# Patient Record
(demographics unavailable — no encounter records)

---

## 2017-09-02 NOTE — EMERGENCY ROOM VISIT NOTE
History


First contact with patient:  02:14


Chief Complaint:  ALCOHOL OVERDOSE


Stated Complaint:  UNCONSCIOUS ALCOHOL OVERDOSE


Nursing Triage Summary:  


pt brought to ed via ems from friends apartment on [facility]. per friends pt 


was drinking 4-5 cups junlge juice and was vomiting. friends attempted to clean 


pt and were unable to get pt up.





History of Present Illness


The patient is a 19 year old female who presents to the Emergency Room with 

complaints of alcohol overdose.  Patient was vomiting and the friend's 

apartment and they summoned EMS.  They tried to clean her up and shower her.  

Patient is intoxicated and moans to sternal rub.  EMS reports no fall or trauma.





Review of Systems


Unable to obtain secondary to altered mental status from alcohol intoxication





Past Medical/Surgical History


Unable to obtain secondary to altered mental status from alcohol intoxication





Social History


Smoking Status:  Unknown if Ever Smoked


Occupation Status:  [employer]





Current/Historical Medications


Unable to Obtain Active Prescriptions or Reported Meds





Physical Exam


Vital Signs











  Date Time  Temp Pulse Resp B/P (MAP) Pulse Ox O2 Delivery O2 Flow Rate FiO2


 


9/2/17 06:26  79      


 


9/2/17 06:00  92 14 96/48 96 Room Air  


 


9/2/17 05:00  76 14 111/67 96 Room Air  


 


9/2/17 04:16  89 18 112/61 96 Room Air  


 


9/2/17 02:25  77      


 


9/2/17 02:21 36.7 81 18 102/54 97 Room Air  


 


9/2/17 02:21     98 Room Air  











Physical Exam








PHYSICAL EXAM: VITALS: Vitals are noted on the nurse's note and reviewed by 

myself.  Vital signs stable.


GENERAL: Passed out female with EtOH odor who responds to sternal rub, in no 

acute distress, nondiaphoretic, well-developed well-nourished.  The patient is 

visibly intoxicated.


SKIN: The skin was without obvious lacerations, abrasions, or rashes.  There is 

no tenting of the skin.  Capillary reflex less than 2 seconds.


HEENT: Normocephalic, atraumatic.  PERRLA.  EOMI.  Conjunctiva with mild 

injection without icterus.  Tympanic membranes without erythema or effusion 

bilaterally no hemotympanum.  External auditory canals are clear.  Nares patent 

bilaterally.  No epistaxis.  Oropharynx without erythema or exudate.  Uvula 

midline.  Oral mucosal moist.  No lymphadenopathy.  Neck is supple without 

cervical spine tenderness.


HEART: Regular rate and rhythm without murmurs gallops or rubs.  Peripheral 

pulses 2+.


LUNGS: Clear to auscultation bilaterally without wheezes, rales or rhonchi.  


ABDOMEN: Positive bowel sounds x 4.  Normal tympanic percussion.  Soft, 

nontender, without masses or organomegaly.


MUSCULOSKELETAL: Gross motor function of the upper and lower extremities 

intact.  The patient has a staggering gait.


NEUROLOGIC:  The patient is visibly intoxicated.  Once they were more sober 

they were alert and oriented to person place and time.





Medical Decision & Procedures


Laboratory Results


9/2/17 02:25

















Test


  9/2/17


02:25


 


Anion Gap


  7.0 mmol/L


(3-11)


 


Estimated GFR (


American) 136.1 


 


 


Estimated GFR (Non-


American 117.4 


 


 


BUN/Creatinine Ratio 10.1 (10-20) 


 


Calcium Level


  8.2 mg/dl


(8.5-10.1)


 


Ethyl Alcohol mg/dL


  199.0 mg/dl


(0-3)











ED Course


Prior records/ancillary studies reviewed.


Triage Nursing notes reviewed.


Additional history obtained from EMS.





The patient's history was concerning for altered mental status and a possible 

alcohol overdose.





Differential diagnosis:


Etiologies such as alcohol intoxication, toxicologic, infection, hypoglycemia, 

electrolyte abnormalities, cardiac sources, intracerebral event, neurologic, as 

well as others were entertained.





Physical examination:


As above.  The patient is clinically intoxicated.  no trauma noted.





ER treatment provided:


Monitoring


Aspiration precautions


The patient was frequently reassessed.





Diagnostic interpretation by me:


Cardiac monitoring did not reveal any evidence of dysrhythmia.





The labs revealed no worrisome electrolyte abnormality. The patient's blood 

alcohol level was 199 mg/dL.








The patient's history was reviewed once they were more coherent and their 

intoxication cleared. The patient states they have been in good health recently 

and had no medical complaints. The patient admitted to consuming alcohol.  No 

additional concerning findings were noted. The patient complained of no 

symptoms to suggest assault.





This appears to be consistent with an isolated overdose of alcohol.





By the evaluation outlined above emergent etiologies such as trauma, infection, 

hypoglycemia, electrolyte abnormalities, cardiac sources, intracerebral event, 

neurologic,as well as others were deemed relatively unlikely.





The patient was informed about the findings as listed above. The patient was 

counseled on the dangers of excessive alcohol use.  I gave my usual and 

customary discussion regarding this issue.  All questions were answered and the 

patient was pleased with the treatment. Return instructions were outlined and 

the patient was discharged in stable condition once their mental status 

improved and a safe destination was confirmed.





Outpatient prescription management:


None





Referral:


The patient was referred back to their primary care physician for follow-up in 

2 to 3 days for a recheck of their current condition.





Medical Decision


as above





Medication Reconcilliation


Current Medication List:  was personally reviewed by me





Blood Pressure Screening


Patient's blood pressure:  Normal blood pressure





Impression





 Primary Impression:  


 Alcohol use with intoxication





Departure Information


Dispostion


Home / Self-Care





Condition


GOOD





Prescriptions





Unable to Obtain Active Prescriptions or Reported Meds





Patient Instructions


My [facility]





Additional Instructions





Keep well-hydrated.  Tylenol every 6 hours as needed for pain (Maximum 3000 mg 

Tylenol in 24 hr period).  





Follow up with family doctor and/or health services as needed.





No driving for the next 24 hours.





Recommend no alcohol for the next 48 hours and avoid binge drinking in the 

future.





Return to ER sooner for chest pain, abdominal pain, worsening signs or symptoms 

or as needed.